# Patient Record
Sex: FEMALE | Race: WHITE | Employment: UNEMPLOYED | URBAN - METROPOLITAN AREA
[De-identification: names, ages, dates, MRNs, and addresses within clinical notes are randomized per-mention and may not be internally consistent; named-entity substitution may affect disease eponyms.]

---

## 2017-11-02 ENCOUNTER — OFFICE VISIT (OUTPATIENT)
Dept: OBGYN CLINIC | Age: 42
End: 2017-11-02
Payer: COMMERCIAL

## 2017-11-02 ENCOUNTER — HOSPITAL ENCOUNTER (OUTPATIENT)
Age: 42
Setting detail: SPECIMEN
Discharge: HOME | End: 2017-11-02
Payer: COMMERCIAL

## 2017-11-02 VITALS
DIASTOLIC BLOOD PRESSURE: 80 MMHG | HEIGHT: 64 IN | SYSTOLIC BLOOD PRESSURE: 140 MMHG | WEIGHT: 177 LBS | BODY MASS INDEX: 30.22 KG/M2

## 2017-11-02 DIAGNOSIS — Z01.419 VISIT FOR GYNECOLOGIC EXAMINATION: Primary | ICD-10-CM

## 2017-11-02 DIAGNOSIS — Z12.39 SCREENING FOR BREAST CANCER: ICD-10-CM

## 2017-11-02 PROCEDURE — 99396 PREV VISIT EST AGE 40-64: CPT | Performed by: OBSTETRICS & GYNECOLOGY

## 2017-11-02 ASSESSMENT — ENCOUNTER SYMPTOMS
BACK PAIN: 0
DIARRHEA: 0
ABDOMINAL PAIN: 0
SHORTNESS OF BREATH: 0
CONSTIPATION: 0
ABDOMINAL DISTENTION: 0
COUGH: 0

## 2017-11-02 NOTE — PROGRESS NOTES
Subjective:      Patient ID: Angel Scott is a 40 y.o. female. HPI   Angel Scott is a 40 y.o. [id number], here for her annual exam.  The patient was seen and examined. The patients past medical, surgical, social and family history were reviewed. Current medications and allergies were reviewed, and documented in the chart. The patient reports she has not had a period since September, but she blames her irregular thyroid which is being treated. I warned her to call if she has periods more than monthly. She still has a leaking bladder comments that she was worn a pad since the delivery for her last child who is now 6. She saw a urologist sometime ago who discussed doing a sling. I will give her the name of uro-gynecologist again  Menstrual history: See above  Birth control: Tubal ligation    Blood pressure (!) 140/80, height 5' 3.75\" (1.619 m), weight 177 lb (80.3 kg), last menstrual period 09/22/2017, not currently breastfeeding. Wt Readings from Last 3 Encounters:   11/02/17 177 lb (80.3 kg)   11/10/15 177 lb (80.3 kg)   04/09/14 159 lb (72.1 kg)     No results found for this or any previous visit (from the past 8736 hour(s)).     Past Medical History:   Diagnosis Date    Bladder problem     Hyperthyroidism     Lupus                                                                    Past Surgical History:   Procedure Laterality Date    COLPOSCOPY      DILATION AND CURETTAGE OF UTERUS  1994    KNEE SURGERY      KNEE SURGERY Left 1994    TUBAL LIGATION       Family History   Problem Relation Age of Onset    Heart Disease Maternal Grandmother     Emphysema Maternal Grandfather      History   Smoking Status    Current Every Day Smoker    Packs/day: 1.00    Years: 17.00    Types: Cigarettes   Smokeless Tobacco    Never Used     History   Alcohol Use No       MEDICATIONS:  Current Outpatient Prescriptions   Medication Sig Dispense Refill    tolterodine (DETROL LA) 2 MG ER capsule Take 1

## 2017-11-10 LAB — CYTOLOGY REPORT: NORMAL

## 2017-12-13 DIAGNOSIS — Z12.39 SCREENING FOR BREAST CANCER: ICD-10-CM

## 2018-12-06 DIAGNOSIS — Z12.31 ENCOUNTER FOR SCREENING MAMMOGRAM FOR MALIGNANT NEOPLASM OF BREAST: Primary | ICD-10-CM

## 2019-01-03 DIAGNOSIS — Z12.31 ENCOUNTER FOR SCREENING MAMMOGRAM FOR MALIGNANT NEOPLASM OF BREAST: ICD-10-CM

## 2019-01-17 ENCOUNTER — OFFICE VISIT (OUTPATIENT)
Dept: OBGYN CLINIC | Age: 44
End: 2019-01-17
Payer: COMMERCIAL

## 2019-01-17 ENCOUNTER — HOSPITAL ENCOUNTER (OUTPATIENT)
Age: 44
Setting detail: SPECIMEN
Discharge: HOME | End: 2019-01-17
Payer: COMMERCIAL

## 2019-01-17 VITALS
BODY MASS INDEX: 28.13 KG/M2 | DIASTOLIC BLOOD PRESSURE: 80 MMHG | SYSTOLIC BLOOD PRESSURE: 138 MMHG | HEIGHT: 64 IN | WEIGHT: 164.8 LBS

## 2019-01-17 DIAGNOSIS — Z12.39 SCREENING FOR BREAST CANCER: ICD-10-CM

## 2019-01-17 DIAGNOSIS — Z01.419 VISIT FOR GYNECOLOGIC EXAMINATION: Primary | ICD-10-CM

## 2019-01-17 PROCEDURE — 99396 PREV VISIT EST AGE 40-64: CPT | Performed by: OBSTETRICS & GYNECOLOGY

## 2019-01-17 ASSESSMENT — ENCOUNTER SYMPTOMS
CONSTIPATION: 0
ABDOMINAL PAIN: 0
BACK PAIN: 0
COUGH: 0
ABDOMINAL DISTENTION: 0
DIARRHEA: 0
SHORTNESS OF BREATH: 0

## 2019-02-01 LAB — CYTOLOGY REPORT: NORMAL
